# Patient Record
Sex: FEMALE | URBAN - METROPOLITAN AREA
[De-identification: names, ages, dates, MRNs, and addresses within clinical notes are randomized per-mention and may not be internally consistent; named-entity substitution may affect disease eponyms.]

---

## 2020-03-11 ENCOUNTER — APPOINTMENT (RX ONLY)
Dept: URBAN - METROPOLITAN AREA CLINIC 74 | Facility: CLINIC | Age: 25
Setting detail: DERMATOLOGY
End: 2020-03-11

## 2020-03-11 DIAGNOSIS — L71.8 OTHER ROSACEA: ICD-10-CM

## 2020-03-11 PROCEDURE — 99202 OFFICE O/P NEW SF 15 MIN: CPT

## 2020-03-11 PROCEDURE — ? COUNSELING

## 2020-03-11 PROCEDURE — ? PATIENT SPECIFIC COUNSELING

## 2020-03-11 PROCEDURE — ? TREATMENT REGIMEN

## 2020-03-11 PROCEDURE — ? PRESCRIPTION

## 2020-03-11 RX ORDER — IVERMECTIN 10 MG/G
CREAM TOPICAL QD
Qty: 1 | Refills: 2 | Status: ERX | COMMUNITY
Start: 2020-03-11

## 2020-03-11 RX ORDER — TRIFAROTENE 50 UG/G
CREAM TOPICAL
Qty: 1 | Refills: 2 | Status: ERX | COMMUNITY
Start: 2020-03-11

## 2020-03-11 RX ADMIN — IVERMECTIN: 10 CREAM TOPICAL at 00:00

## 2020-03-11 RX ADMIN — TRIFAROTENE: 50 CREAM TOPICAL at 00:00

## 2020-03-11 ASSESSMENT — LOCATION ZONE DERM: LOCATION ZONE: FACE

## 2020-03-11 ASSESSMENT — LOCATION DETAILED DESCRIPTION DERM: LOCATION DETAILED: LEFT INFERIOR MEDIAL FOREHEAD

## 2020-03-11 ASSESSMENT — LOCATION SIMPLE DESCRIPTION DERM: LOCATION SIMPLE: LEFT FOREHEAD

## 2020-03-11 NOTE — PROCEDURE: PATIENT SPECIFIC COUNSELING
Detail Level: Detailed
- pt only used Rx for about a month last year before she moved to [place], pt did not see improvement \\n- pt used to be on birth control in past but does not think it helped with rosacea\\n- discussed oral antibiotics vs topicals, pt opened to start both\\n- recommended gentle cleanser and moisturizers, recommended she applies moisturizer after night topical if needed to help with dryness and irritation \\n- Discussed importance of SPF 30+/ sun protection, recommended blue lizard \\nRTC 2 months

## 2020-03-11 NOTE — PROCEDURE: TREATMENT REGIMEN
Initiate Treatment: Soolantra cream QAM \\nAklief cream QHS \\nXimino 90mg QD x 2 months
Detail Level: Zone

## 2020-05-06 ENCOUNTER — APPOINTMENT (RX ONLY)
Dept: URBAN - METROPOLITAN AREA CLINIC 74 | Facility: CLINIC | Age: 25
Setting detail: DERMATOLOGY
End: 2020-05-06

## 2020-05-06 DIAGNOSIS — L71.8 OTHER ROSACEA: ICD-10-CM | Status: IMPROVED

## 2020-05-06 PROCEDURE — 99213 OFFICE O/P EST LOW 20 MIN: CPT

## 2020-05-06 PROCEDURE — ? COUNSELING

## 2020-05-06 PROCEDURE — ? TREATMENT REGIMEN

## 2020-05-06 PROCEDURE — ? PRESCRIPTION

## 2020-05-06 RX ORDER — METRONIDAZOLE 7.5 MG/G
CREAM TOPICAL
Qty: 1 | Refills: 2 | Status: ERX | COMMUNITY
Start: 2020-05-06

## 2020-05-06 RX ADMIN — METRONIDAZOLE: 7.5 CREAM TOPICAL at 00:00

## 2020-05-06 ASSESSMENT — SEVERITY ASSESSMENT OVERALL AMONG ALL PATIENTS
IN YOUR EXPERIENCE, AMONG ALL PATIENTS YOU HAVE SEEN WITH THIS CONDITION, HOW SEVERE IS THIS PATIENT'S CONDITION?: MILD TO MODERATE

## 2020-05-06 ASSESSMENT — LOCATION DETAILED DESCRIPTION DERM: LOCATION DETAILED: LEFT INFERIOR MEDIAL FOREHEAD

## 2020-05-06 ASSESSMENT — LOCATION SIMPLE DESCRIPTION DERM: LOCATION SIMPLE: LEFT FOREHEAD

## 2020-05-06 ASSESSMENT — LOCATION ZONE DERM: LOCATION ZONE: FACE

## 2020-05-06 NOTE — PROCEDURE: TREATMENT REGIMEN
Initiate Treatment: MetroCream apply to face QAM after moisturizer \\nXimino 90mg QD x 2 months
Discontinue Regimen: Soolantra
Continue Regimen: Aklief cream QHS
Otc Regimen: CeraVe PM or CeraVe cream daily-twice daily\\nEucerin advanced repair \\nNeutrogena ultra gentle foaming cleanser
Detail Level: Zone
Plan: - overall acne rosacea improved; reports skin tone/texture improved but redness still recurring. Patient completed 1 mo of Ximino (didn’t know she had refs). Reports not tolerating soolantra well; burns her face. \\n- will plan to hold off on Ximino for now since improved with topicals; can call [facility] for ref if flares worsen \\n- Advised pt to try applying moisturizer before using MetroCream in the morning \\n- continue SPF 30+/ sun protection